# Patient Record
Sex: MALE | Race: WHITE | NOT HISPANIC OR LATINO | URBAN - METROPOLITAN AREA
[De-identification: names, ages, dates, MRNs, and addresses within clinical notes are randomized per-mention and may not be internally consistent; named-entity substitution may affect disease eponyms.]

---
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
## 2021-01-12 ENCOUNTER — NURSE TRIAGE (OUTPATIENT)
Dept: OTHER | Facility: OTHER | Age: 21
End: 2021-01-12

## 2021-01-12 DIAGNOSIS — Z11.59 SPECIAL SCREENING EXAMINATION FOR VIRAL DISEASE: Primary | ICD-10-CM

## 2021-01-12 NOTE — TELEPHONE ENCOUNTER
Regarding: COVID: direct exposure (3/3)  ----- Message from Charisma Montes sent at 1/12/2021  4:15 PM EST -----  "I had direct exposure to a positive case"

## 2021-01-12 NOTE — TELEPHONE ENCOUNTER
1  Were you within 6 feet or less, for up to 15 minutes or more with a person that has a confirmed COVID-19 test? Yes, GF  2  What was the date of your exposure? 01/07/2021  3  Are you experiencing any symptoms attributed to the virus?  (Assess for SOB, cough, fever, difficulty breathing) Denies  4  HIGH RISK: Do you have any history heart or lung conditions, weakened immune system, diabetes, Asthma, CHF, HIV, COPD, Chemo, renal failure, sickle cell, etc? Healthy  5  PREGNANCY: Are you pregnant or did you recently give birth?  N/A

## 2021-01-13 DIAGNOSIS — Z11.59 SPECIAL SCREENING EXAMINATION FOR VIRAL DISEASE: ICD-10-CM

## 2021-01-13 PROCEDURE — U0005 INFEC AGEN DETEC AMPLI PROBE: HCPCS

## 2021-01-13 PROCEDURE — U0003 INFECTIOUS AGENT DETECTION BY NUCLEIC ACID (DNA OR RNA); SEVERE ACUTE RESPIRATORY SYNDROME CORONAVIRUS 2 (SARS-COV-2) (CORONAVIRUS DISEASE [COVID-19]), AMPLIFIED PROBE TECHNIQUE, MAKING USE OF HIGH THROUGHPUT TECHNOLOGIES AS DESCRIBED BY CMS-2020-01-R: HCPCS

## 2021-01-14 LAB — SARS-COV-2 RNA RESP QL NAA+PROBE: NEGATIVE

## 2025-05-27 ENCOUNTER — OFFICE VISIT (OUTPATIENT)
Dept: URGENT CARE | Facility: CLINIC | Age: 25
End: 2025-05-27
Payer: COMMERCIAL

## 2025-05-27 ENCOUNTER — APPOINTMENT (OUTPATIENT)
Dept: RADIOLOGY | Facility: CLINIC | Age: 25
End: 2025-05-27
Payer: COMMERCIAL

## 2025-05-27 VITALS
RESPIRATION RATE: 20 BRPM | OXYGEN SATURATION: 99 % | SYSTOLIC BLOOD PRESSURE: 112 MMHG | HEIGHT: 71 IN | DIASTOLIC BLOOD PRESSURE: 76 MMHG | BODY MASS INDEX: 32.79 KG/M2 | WEIGHT: 234.2 LBS | TEMPERATURE: 96.5 F

## 2025-05-27 DIAGNOSIS — M79.644 FINGER PAIN, RIGHT: ICD-10-CM

## 2025-05-27 DIAGNOSIS — M79.644 FINGER PAIN, RIGHT: Primary | ICD-10-CM

## 2025-05-27 PROCEDURE — 73130 X-RAY EXAM OF HAND: CPT

## 2025-05-27 PROCEDURE — S9083 URGENT CARE CENTER GLOBAL: HCPCS

## 2025-05-27 PROCEDURE — G0383 LEV 4 HOSP TYPE B ED VISIT: HCPCS

## 2025-05-27 NOTE — LETTER
May 27, 2025     Patient: Meño Mccray   YOB: 2000   Date of Visit: 5/27/2025       To Whom it May Concern:    Meño Mccray was seen in my clinic on 5/27/2025. He may return to work on 5/28/25.    If you have any questions or concerns, please don't hesitate to call.         Sincerely,          Narendra Trent PA-C        CC: No Recipients

## 2025-05-27 NOTE — PROGRESS NOTES
[facility]  Name: Meño Mccray      : 2000      MRN: [id number]  Encounter Provider: Narendra Trent PA-C  Encounter Date: 2025   Encounter department: [facility] [place]  :  Assessment & Plan  Finger pain, right    Orders:    XR hand 3+ vw right; Future    Ambulatory Referral to Orthopedic Surgery; Future    Preliminary review of x-ray shows no acute osseous abnormality.  Orthopedic referral provided should patient need in the future, likely to be contusion at this time.    Patient Instructions  Follow up with PCP in 3-5 days.  Proceed to  ER if symptoms worsen.    If tests are performed, our office will contact you with results only if changes need to made to the care plan discussed with you at the visit. You can review your full results on [facility] MyChart.    Chief Complaint:   Chief Complaint   Patient presents with    Hand Injury     Right pointer and middle finger pain.  Fell  night.     History of Present Illness   24-year-old male presenting with right-sided 2nd and 3rd digit pain x 3 days.  Patient states that he fell off the bunk of his truck into the ground almost like a closed fist.  Reported direct contact with the ground but no loss of consciousness or head strike.  States that he had pain in his first 2nd through 4th digit at the time but the fourth digit has completely improved without any issues.  Denying any numbness or tingling.  Has been doing some ibuprofen for the pain which helps but notes that it does feel tender to touch and stiff with decreased range of motion when trying to bend at the PIP for both the 2nd and 3rd digit.  Denies any prior injury.  Denies any deformity.  Has been maine taping in the meantime    Hand Injury   Pertinent negatives include no chest pain or numbness.         Review of Systems   Constitutional:  Negative for chills, fatigue, fever and unexpected weight change.   HENT:  Negative for congestion and sore throat.   "  Respiratory:  Negative for cough and shortness of breath.    Cardiovascular:  Negative for chest pain and palpitations.   Gastrointestinal:  Negative for diarrhea, nausea and vomiting.   Genitourinary:  Negative for decreased urine volume, dysuria and frequency.   Musculoskeletal:  Positive for arthralgias. Negative for back pain and joint swelling.   Skin:  Negative for wound.   Neurological:  Negative for weakness, light-headedness and numbness.     Past Medical History   Past Medical History[1]  Past Surgical History[2]  Family History[3]  he reports that he has never smoked. He has never used smokeless tobacco. He reports current alcohol use.  No current outpatient medicationsAllergies[4]     Objective   /76   Temp (!) 96.5 °F (35.8 °C)   Resp 20   Ht 5' 10.5\" (1.791 m)   Wt 106 kg (234 lb 3.2 oz)   SpO2 99%   BMI 33.13 kg/m²      Physical Exam  Vitals and nursing note reviewed.   Constitutional:       General: He is not in acute distress.     Appearance: He is normal weight.   HENT:      Head: Normocephalic and atraumatic.      Right Ear: External ear normal.      Left Ear: External ear normal.      Nose: Nose normal.      Mouth/Throat:      Mouth: Mucous membranes are moist.      Pharynx: Oropharynx is clear.     Eyes:      Conjunctiva/sclera: Conjunctivae normal.      Pupils: Pupils are equal, round, and reactive to light.       Cardiovascular:      Rate and Rhythm: Normal rate and regular rhythm.      Pulses: Normal pulses.   Pulmonary:      Effort: Pulmonary effort is normal.     Musculoskeletal:      Comments: Normal hand range of motion aside from 2nd and 3rd PIP flexion being slightly decreased/stiff.  Tenderness to palpation along the proximal phalanx to DIP of both 2nd and 3rd digit.  Neurovascularly intact.  Mild swelling noted.  No wounds.     Skin:     General: Skin is warm and dry.      Capillary Refill: Capillary refill takes less than 2 seconds.     Neurological:      General: No " "focal deficit present.      Mental Status: He is alert and oriented to person, place, and time.      Cranial Nerves: No cranial nerve deficit.      Sensory: No sensory deficit.      Motor: No weakness.     Psychiatric:         Mood and Affect: Mood normal.         Behavior: Behavior normal.         Portions of the record may have been created with voice recognition software.  Occasional wrong word or \"sound a like\" substitutions may have occurred due to the inherent limitations of voice recognition software.  Read the chart carefully and recognize, using context, where substitutions have occurred.       [1]   Past Medical History:  Diagnosis Date    Known health problems: none    [2]   Past Surgical History:  Procedure Laterality Date    NO PAST SURGERIES     [3]   Family History  Problem Relation Name Age of Onset    No Known Problems Mother     [4]   Allergies  Allergen Reactions    Augmentin [Amoxicillin-Pot Clavulanate] Rash     "

## 2025-08-10 ENCOUNTER — HOSPITAL ENCOUNTER (EMERGENCY)
Facility: HOSPITAL | Age: 25
Discharge: HOME | End: 2025-08-10
Admitting: EMERGENCY MEDICINE
Payer: COMMERCIAL